# Patient Record
(demographics unavailable — no encounter records)

---

## 2025-06-11 NOTE — REVIEW OF SYSTEMS
INTERVAL HPI/OVERNIGHT EVENTS:    pt reports no acute bleeding or abdominal pain     MEDICATIONS  (STANDING):  venlafaxine XR. 37.5 milliGRAM(s) Oral at bedtime  pantoprazole  Injectable 40 milliGRAM(s) IV Push two times a day  influenza   Vaccine 0.5 milliLiter(s) IntraMuscular once  iron sucrose IVPB 200 milliGRAM(s) IV Intermittent daily    MEDICATIONS  (PRN):  acetaminophen   Tablet. 650 milliGRAM(s) Oral every 6 hours PRN Mild Pain (1 - 3)  ALPRAZolam 0.25 milliGRAM(s) Oral at bedtime PRN Anxiety      Allergies    No Known Allergies    Intolerances        Review of Systems:    General:  No wt loss, fevers, chills, night sweats, fatigue   Eyes:  Good vision, no reported pain  ENT:  No sore throat, pain, runny nose, dysphagia  CV:  No pain, palpitations, hypo/hypertension  Resp:  No dyspnea, cough, tachypnea, wheezing  GI:  No pain, No nausea, No vomiting, No diarrhea, No constipation, No weight loss, No fever, No pruritis, No rectal bleeding, No melena, No dysphagia  :  No pain, bleeding, incontinence, nocturia  Muscle:  No pain, weakness  Neuro:  No weakness, tingling, memory problems  Psych:  No fatigue, insomnia, mood problems, depression  Endocrine:  No polyuria, polydypsia, cold/heat intolerance  Heme:  No petechiae, ecchymosis, easy bruisability  Skin:  No rash, tattoos, scars, edema      Vital Signs Last 24 Hrs  T(C): 37 (25 Sep 2017 06:31), Max: 37 (25 Sep 2017 06:31)  T(F): 98.6 (25 Sep 2017 06:31), Max: 98.6 (25 Sep 2017 06:31)  HR: 61 (25 Sep 2017 06:31) (60 - 62)  BP: 115/53 (25 Sep 2017 06:31) (115/53 - 126/79)  BP(mean): --  RR: 18 (25 Sep 2017 06:31) (18 - 19)  SpO2: 97% (25 Sep 2017 06:31) (93% - 97%)    PHYSICAL EXAM:    Constitutional: NAD  HEENT: EOMI, throat clear  Neck: No LAD, supple  Respiratory: CTA and P  Cardiovascular: S1 and S2, RRR, no M  Gastrointestinal: BS+, soft, NT/ND, neg HSM,  Extremities: No peripheral edema, neg clubbing, cyanosis  Vascular: 2+ peripheral pulses  Neurological: A/O x 3, no focal deficits  Psychiatric: Normal mood, normal affect  Skin: No rashes      LABS:                        7.4    6.96  )-----------( 296      ( 25 Sep 2017 06:35 )             24.8     09-25    141  |  104  |  25<H>  ----------------------------<  99  4.2   |  26  |  0.89    Ca    8.5      25 Sep 2017 06:35      RADIOLOGY & ADDITIONAL TESTS:  Surgical Pathology Report (09.19.17 @ 12:58)    Surgical Pathology Report:   ACCESSION No:  [id number]    DURAN SIMS                    1        Surgical Final Report          Final Diagnosis  1-Cardia mass, biopsy:  - Granulation tissue, fat necrosis and necro-inflammatory  exudate.  - No epithelium is present.  - No malignancy identified, see note.    Note: Clinical information of "submucosal mass" is noted.  Multiple levels and immunostains for AE1/AE3 and  Cam 5.2 were examined.    Verified by: Tory Gonzalez M.D.  (Electronic Signature)  Reported on: 09/25/1711:45 EDT,[address] [place] [address]  [id number]  _________________________________________________________________  ____    Clinical History  ? submucosal mass, EGD    Specimen(s) Submitted  1-Cardia mass    Gross Description  09/19/17 20:50  The specimen is received in formalin and the specimen container  is labeled: Cardia mass.  It consists of at least eight fragments  of soft tan tissue ranging from 0.1 cm to 0.3 cm in greatest  dimension.  Entirely submitted.  One cassette.    In addition toother data that may appear on the specimen  container, the label has been inspected to confirm the presence  of the patient's name and date of birth.  TK 09/19/17 20:51 [Negative] : Gastrointestinal

## 2025-06-11 NOTE — PHYSICAL EXAM
[Alert] : alert [No Respiratory Distress] : no respiratory distress [Normal Appearance] : the appearance of the neck was normal [Bowel Sounds] : normal bowel sounds [Heart Rate And Rhythm] : heart rate was normal and rhythm regular [Oriented To Time, Place, And Person] : oriented to person, place, and time

## 2025-06-11 NOTE — ASSESSMENT
[FreeTextEntry1] : The patient is a 42-year-old lady who was recently seen in the ED with abdominal pain and dry heaving for the past few days and was evaluated with basic blood work and an abdominal CT which did not show any acute pathology and she was discharged with follow-up with gastroenterology in clinic.  Today the patient states that her abdominal pain is resolved.  Her abdominal pain is epigastric.  Denies any aggravating or relieving factors.  She denies overt signs of GI bleeding, weight loss, change in bowel habits, fever, chills.  CAT scan did not show any GI abnormalities but showed a possible fibroid and she has a follow-up with GYN. Since she has not had any endoscopy procedures in the past.  Abdominal pain, epigastric  Currently resolved CT does not show any acute gastrointestinal pathology. She has a gynecology follow-up for suspected fibroid seen on CAT scan Risks, benefits and alternatives of an upper endoscopy was discussed in detail and the patient states understanding and requests to proceed with the procedure.  Patient aware to reach out to us or go to the ED if abdominal pain is severe and persistent.  Follow-up after EGD

## 2025-06-11 NOTE — HISTORY OF PRESENT ILLNESS
[FreeTextEntry1] : The patient is a 42-year-old lady who was recently seen in the ED with abdominal pain and dry heaving for the past few days and was evaluated with basic blood work and an abdominal CT which did not show any acute pathology and she was discharged with follow-up with gastroenterology in clinic.  Today the patient states that her abdominal pain is resolved.  Her abdominal pain is epigastric.  Denies any aggravating or relieving factors.  She denies overt signs of GI bleeding, weight loss, change in bowel habits, fever, chills.  CAT scan did not show any GI abnormalities but showed a possible fibroid and she has a follow-up with GYN. Since she has not had any endoscopy procedures in the past.